# Patient Record
Sex: FEMALE | Race: OTHER | NOT HISPANIC OR LATINO | Employment: OTHER | URBAN - METROPOLITAN AREA
[De-identification: names, ages, dates, MRNs, and addresses within clinical notes are randomized per-mention and may not be internally consistent; named-entity substitution may affect disease eponyms.]

---

## 2022-02-21 LAB — CRC RECOMMENDATION EXT: NORMAL

## 2022-03-08 ENCOUNTER — OFFICE VISIT (OUTPATIENT)
Dept: OBSTETRICS AND GYNECOLOGY | Facility: CLINIC | Age: 72
End: 2022-03-08
Payer: MEDICARE

## 2022-03-08 VITALS
BODY MASS INDEX: 30.1 KG/M2 | HEIGHT: 68 IN | DIASTOLIC BLOOD PRESSURE: 82 MMHG | WEIGHT: 198.63 LBS | SYSTOLIC BLOOD PRESSURE: 138 MMHG

## 2022-03-08 DIAGNOSIS — Z01.419 WOMEN'S ANNUAL ROUTINE GYNECOLOGICAL EXAMINATION: Primary | ICD-10-CM

## 2022-03-08 PROCEDURE — G0101 PR CA SCREEN;PELVIC/BREAST EXAM: ICD-10-PCS | Mod: S$GLB,,, | Performed by: OBSTETRICS & GYNECOLOGY

## 2022-03-08 PROCEDURE — G0101 CA SCREEN;PELVIC/BREAST EXAM: HCPCS | Mod: S$GLB,,, | Performed by: OBSTETRICS & GYNECOLOGY

## 2022-03-08 RX ORDER — VALACYCLOVIR HYDROCHLORIDE 1 G/1
TABLET, FILM COATED ORAL
COMMUNITY
Start: 2022-02-24 | End: 2023-01-13 | Stop reason: SDUPTHER

## 2022-03-08 RX ORDER — SERTRALINE HYDROCHLORIDE 100 MG/1
200 TABLET, FILM COATED ORAL DAILY
COMMUNITY
Start: 2022-02-17 | End: 2022-09-30

## 2022-03-08 RX ORDER — ROSUVASTATIN CALCIUM 5 MG/1
5 TABLET, COATED ORAL
COMMUNITY
End: 2023-01-13 | Stop reason: SDUPTHER

## 2022-03-08 RX ORDER — LORATADINE 10 MG/1
10 TABLET ORAL
COMMUNITY
Start: 2022-02-09 | End: 2023-01-13 | Stop reason: SDUPTHER

## 2022-03-08 RX ORDER — TITANIUM DIOXIDE, OCTINOXATE, ZINC OXIDE 4.61; 1.6; .78 G/40ML; G/40ML; G/40ML
CREAM TOPICAL
COMMUNITY

## 2022-03-08 RX ORDER — OXYBUTYNIN CHLORIDE 5 MG/1
TABLET ORAL
COMMUNITY

## 2022-03-08 RX ORDER — ASCORBIC ACID 500 MG
500 TABLET ORAL
COMMUNITY

## 2022-03-08 RX ORDER — HYDROCHLOROTHIAZIDE 12.5 MG/1
CAPSULE ORAL
COMMUNITY
Start: 2022-02-09 | End: 2022-09-30

## 2022-03-08 RX ORDER — BACLOFEN 20 MG
TABLET ORAL
COMMUNITY

## 2022-03-08 RX ORDER — PNV NO.95/FERROUS FUM/FOLIC AC 28MG-0.8MG
TABLET ORAL
COMMUNITY
End: 2023-01-13 | Stop reason: SDUPTHER

## 2022-03-08 RX ORDER — CYANOCOBALAMIN (VITAMIN B-12) 2500 MCG
TABLET, SUBLINGUAL SUBLINGUAL
COMMUNITY

## 2022-03-08 RX ORDER — BIOTIN 5 MG
2 TABLET ORAL DAILY
COMMUNITY

## 2022-03-08 NOTE — PROGRESS NOTES
Annual Well Woman's Exam  History & Physical      SUBJECTIVE:     History of Present Illness:  Patient is a 71 y.o. female presents for her annual exam.  She has no complaints today.  Her [facility] is ordered her mammogram, but she has not yet completed this screening.  She had a colonoscopy performed last month.  Her prep was not adequate, so Dr. Ceron is planning to repeat her colonoscopy next year.      She is status post hysterectomy in 1987 for endometriosis.  She had 1 of her ovaries removed at that time.  Reports that she went through menopause approximately 5 years later and was started on hormone replacement therapy.  She is no longer on hormone replacement therapy.    She denies a history of abnormal Pap smear.  She has been with her  for 44 years.    She recently moved here from [place], [place].  Unfortunately her son-in-law who was in [place] passed away earlier this week.  She is very tearful when discussing this.    Chief Complaint   Patient presents with    Well Woman       Review of patient's allergies indicates:   Allergen Reactions    Adhesive Rash    Codeine Nausea Only    Latex, natural rubber Dermatitis, Itching and Rash       Current Outpatient Medications   Medication Sig Dispense Refill    ascorbic acid, vitamin C, (VITAMIN C) 500 MG tablet Place 500 mg under the tongue.      aspirin 81 mg Cap       biotin 5,000 mcg Subl Place under the tongue.      CALCIUM CARBONATE-VITAMIN D3 ORAL Take by mouth.      cranberry 400 mg Cap Take by mouth.      cyanocobalamin (VITAMIN B-12) 100 MCG tablet Take by mouth.      wwvomein-xjdf-tbc6-C-shruthi-bosw 750-625-30 mg Tab Take 2 tablets by mouth once daily.      hydroCHLOROthiazide (MICROZIDE) 12.5 mg capsule       loratadine (CLARITIN) 10 mg tablet Take 10 mg by mouth.      magnesium oxide 500 mg Tab Place under the tongue.      MULTIVITAMIN ORAL Take by mouth.      OMEPRAZOLE ORAL       oxybutynin (DITROPAN) 5 MG Tab       rosuvastatin  "(CRESTOR) 5 MG tablet Place 5 mg under the tongue.      sertraline (ZOLOFT) 100 MG tablet Take 200 mg by mouth once daily.      valACYclovir (VALTREX) 1000 MG tablet SMARTSI Tablet(s) By Mouth Every 12 Hours       No current facility-administered medications for this visit.     OB History        3    Para   3    Term   3            AB        Living   3       SAB        IAB        Ectopic        Multiple        Live Births                 No LMP recorded. Patient has had a hysterectomy.      Past Medical History:   Diagnosis Date    Hyperlipidemia     Hypertension      Past Surgical History:   Procedure Laterality Date    APPENDECTOMY      HYSTERECTOMY      TOTAL KNEE ARTHROPLASTY Right     TOTAL SHOULDER ARTHROPLASTY Right      Family History   Problem Relation Age of Onset    Diabetes Mother      Social History     Tobacco Use    Smoking status: Never Smoker    Smokeless tobacco: Never Used   Substance Use Topics    Alcohol use: Yes    Drug use: Never   She has a daughter that lives in [place] Her  passed away this week from cancer.  She has another daughter that lives in [place].  She has a son that lives in [place] with his partner.    OBJECTIVE:     Vital Signs (Most Recent)  BP: 138/82 (22 0932)  5' 8" (1.727 m)  90.1 kg (198 lb 9.6 oz)     Physical Exam:  Physical Exam  Vitals reviewed. Exam conducted with a chaperone present.   Constitutional:       Appearance: Normal appearance.   HENT:      Head: Normocephalic.   Neck:      Thyroid: No thyroid mass, thyromegaly or thyroid tenderness.   Pulmonary:      Effort: Pulmonary effort is normal.   Chest:   Breasts:      Right: Normal. No axillary adenopathy.      Left: Normal. No axillary adenopathy.       Abdominal:      General: Abdomen is flat.      Palpations: Abdomen is soft.   Genitourinary:     Comments: Atrophic vulva and vagina  Lymphadenopathy:      Upper Body:      Right upper body: No axillary adenopathy.      " Left upper body: No axillary adenopathy.   Skin:     General: Skin is warm and dry.   Neurological:      General: No focal deficit present.      Mental Status: She is alert and oriented to person, place, and time.   Psychiatric:         Mood and Affect: Mood normal.         Behavior: Behavior normal.           ASSESSMENT/PLAN:       ICD-10-CM ICD-9-CM   1. Women's annual routine gynecological examination  Z01.419 V72.31       PLAN:    --Patient is status post hysterectomy in and over age 65.  Paps no longer indicated.  --Mammogram has been ordered.  Encouraged patient to schedule her appointment and have screening performed.  --Colonoscopy is up-to-date.  --Patient does have a diagnosis of osteopenia.  This is managed by her PCM.  --Follow up in 1 year for annual exam or sooner as needed    Portia Hagan MD   Gynecology    [address]  [address]  [place] [address] [phone]

## 2022-08-27 ENCOUNTER — NURSE TRIAGE (OUTPATIENT)
Dept: ADMINISTRATIVE | Facility: CLINIC | Age: 72
End: 2022-08-27
Payer: MEDICARE

## 2022-08-27 NOTE — TELEPHONE ENCOUNTER
"[place]    PCP:  Dr. Trevor Mora    C/O urinary frequency, burning with urination, urinary pressure, and incontinence.  Per protocol, care advised is see HCP within 4 hrs.  OCA offered and accepted.  Instructed to call for worsening/questions/concerns.  VU.    Reason for Disposition   [1] Discomfort (pain, burning or stinging) when passing urine AND [2] female   Artificial heart valve or artificial joint    Additional Information   Negative: Shock suspected (e.g., cold/pale/clammy skin, too weak to stand, low BP, rapid pulse)   Negative: Sounds like a life-threatening emergency to the triager   Negative: Shock suspected (e.g., cold/pale/clammy skin, too weak to stand, low BP, rapid pulse)   Negative: Sounds like a life-threatening emergency to the triager   Negative: [1] Unable to urinate (or only a few drops) > 4 hours AND     [2] bladder feels very full (e.g., palpable bladder or strong urge to urinate)   Negative: Patient sounds very sick or weak to the triager   Negative: [1] SEVERE pain with urination  (e.g., excruciating) AND [2] not improved after 2 hours of pain medicine and Sitz bath   Negative: Fever > 100.5 F (38.1 C)   Negative: Side (flank) or lower back pain present   Negative: Diabetes mellitus or weak immune system (e.g., HIV positive, cancer chemotherapy, transplant patient)   Negative: Bedridden (e.g., nursing home patient, CVA, chronic illness, recovering from surgery)    Answer Assessment - Initial Assessment Questions  1. SYMPTOM: "What's the main symptom you're concerned about?" (e.g., frequency, incontinence)      Burning with urination, frequency, and incontinence  2. ONSET: "When did the  ________  start?"      2am  3. PAIN: "Is there any pain?" If so, ask: "How bad is it?" (Scale: 1-10; mild, moderate, severe)      Burning rated 5-6/10  4. CAUSE: "What do you think is causing the symptoms?"      UTI  5. OTHER SYMPTOMS: "Do you have any other symptoms?" (e.g., fever, flank pain, blood in " "urine, pain with urination)      Pain with urination  6. PREGNANCY: "Is there any chance you are pregnant?" "When was your last menstrual period?"      No    Protocols used: Urinary Symptoms-A-AH, Urination Pain - Female-A-AH    "

## 2022-09-30 ENCOUNTER — OFFICE VISIT (OUTPATIENT)
Dept: PODIATRY | Facility: CLINIC | Age: 72
End: 2022-09-30
Payer: MEDICARE

## 2022-09-30 VITALS
BODY MASS INDEX: 30.31 KG/M2 | DIASTOLIC BLOOD PRESSURE: 68 MMHG | HEART RATE: 102 BPM | HEIGHT: 68 IN | WEIGHT: 200 LBS | SYSTOLIC BLOOD PRESSURE: 130 MMHG

## 2022-09-30 DIAGNOSIS — L60.0 INGROWN NAIL: ICD-10-CM

## 2022-09-30 DIAGNOSIS — M79.671 PAIN IN RIGHT FOOT: ICD-10-CM

## 2022-09-30 DIAGNOSIS — M20.41 HAMMER TOE OF RIGHT FOOT: ICD-10-CM

## 2022-09-30 DIAGNOSIS — M20.11 HALLUX VALGUS OF RIGHT FOOT: Primary | ICD-10-CM

## 2022-09-30 PROCEDURE — 99203 OFFICE O/P NEW LOW 30 MIN: CPT | Mod: S$GLB,,, | Performed by: PODIATRIST

## 2022-09-30 PROCEDURE — 99203 PR OFFICE/OUTPT VISIT, NEW, LEVL III, 30-44 MIN: ICD-10-PCS | Mod: S$GLB,,, | Performed by: PODIATRIST

## 2022-09-30 RX ORDER — FLUTICASONE PROPIONATE 50 MCG
2 SPRAY, SUSPENSION (ML) NASAL 2 TIMES DAILY
COMMUNITY
Start: 2022-06-25

## 2022-09-30 RX ORDER — FOLIC ACID 1 MG/1
1 TABLET ORAL
COMMUNITY
Start: 2022-03-23 | End: 2023-03-23

## 2022-09-30 RX ORDER — NITROFURANTOIN 25; 75 MG/1; MG/1
100 CAPSULE ORAL 2 TIMES DAILY
COMMUNITY
Start: 2022-07-13 | End: 2023-07-13 | Stop reason: SDUPTHER

## 2022-09-30 RX ORDER — LANOLIN ALCOHOL/MO/W.PET/CERES
100 CREAM (GRAM) TOPICAL DAILY
COMMUNITY
Start: 2022-06-22

## 2022-09-30 RX ORDER — METHYLPREDNISOLONE 4 MG/1
TABLET ORAL
COMMUNITY
Start: 2022-05-02 | End: 2022-09-30

## 2022-09-30 RX ORDER — CIPROFLOXACIN 500 MG/1
500 TABLET ORAL 2 TIMES DAILY
COMMUNITY
Start: 2022-07-26 | End: 2022-09-30

## 2022-09-30 RX ORDER — LISINOPRIL 10 MG/1
10 TABLET ORAL EVERY MORNING
COMMUNITY
Start: 2022-07-13 | End: 2023-01-13 | Stop reason: SDUPTHER

## 2022-09-30 RX ORDER — HYDROCODONE BITARTRATE AND ACETAMINOPHEN 5; 325 MG/1; MG/1
1 TABLET ORAL
COMMUNITY
Start: 2022-08-01 | End: 2022-11-09

## 2022-09-30 RX ORDER — LISINOPRIL 10 MG/1
10 TABLET ORAL
COMMUNITY
Start: 2022-07-13 | End: 2023-07-13

## 2022-09-30 RX ORDER — SERTRALINE HYDROCHLORIDE 50 MG/1
150 TABLET, FILM COATED ORAL EVERY MORNING
COMMUNITY
Start: 2022-06-20 | End: 2022-11-09 | Stop reason: DRUGHIGH

## 2022-09-30 RX ORDER — LIDOCAINE 50 MG/G
1 PATCH TOPICAL DAILY
COMMUNITY
Start: 2022-07-30 | End: 2023-01-13 | Stop reason: SDUPTHER

## 2022-09-30 RX ORDER — VALACYCLOVIR HYDROCHLORIDE 1 G/1
1000 TABLET, FILM COATED ORAL
COMMUNITY
Start: 2022-07-06 | End: 2024-03-13

## 2022-09-30 RX ORDER — LORATADINE 10 MG/1
10 TABLET ORAL
COMMUNITY
Start: 2022-08-01

## 2022-09-30 RX ORDER — TERBINAFINE HYDROCHLORIDE 250 MG/1
250 TABLET ORAL
COMMUNITY
Start: 2022-09-22 | End: 2022-12-15

## 2022-09-30 RX ORDER — BUSPIRONE HYDROCHLORIDE 10 MG/1
TABLET ORAL
COMMUNITY
Start: 2022-08-09 | End: 2023-01-13 | Stop reason: SDUPTHER

## 2022-09-30 RX ORDER — ROSUVASTATIN CALCIUM 5 MG/1
5 TABLET, COATED ORAL
COMMUNITY
Start: 2022-03-23

## 2022-09-30 RX ORDER — HYDROCODONE BITARTRATE AND ACETAMINOPHEN 10; 325 MG/1; MG/1
1 TABLET ORAL EVERY 6 HOURS PRN
COMMUNITY
Start: 2022-08-23 | End: 2022-11-09

## 2022-09-30 RX ORDER — FUROSEMIDE 20 MG/1
20 TABLET ORAL
COMMUNITY
Start: 2022-06-08 | End: 2023-03-14

## 2022-09-30 RX ORDER — PNV NO.95/FERROUS FUM/FOLIC AC 28MG-0.8MG
100 TABLET ORAL
COMMUNITY
Start: 2022-03-23 | End: 2023-03-23

## 2022-09-30 RX ORDER — ONDANSETRON 4 MG/1
TABLET, ORALLY DISINTEGRATING ORAL
COMMUNITY
Start: 2022-08-23 | End: 2024-03-13

## 2022-09-30 RX ORDER — DOCUSATE SODIUM 100 MG/1
100-200 CAPSULE, LIQUID FILLED ORAL NIGHTLY
COMMUNITY
Start: 2022-08-23

## 2022-09-30 RX ORDER — SCOLOPAMINE TRANSDERMAL SYSTEM 1 MG/1
1 PATCH, EXTENDED RELEASE TRANSDERMAL
COMMUNITY
Start: 2022-07-28 | End: 2023-04-13

## 2022-09-30 RX ORDER — IPRATROPIUM BROMIDE 21 UG/1
2 SPRAY, METERED NASAL 3 TIMES DAILY
COMMUNITY
Start: 2022-08-17

## 2022-09-30 RX ORDER — DEXTROMETHORPHAN HYDROBROMIDE, GUAIFENESIN 5; 100 MG/5ML; MG/5ML
LIQUID ORAL
COMMUNITY

## 2022-09-30 RX ORDER — SERTRALINE HYDROCHLORIDE 50 MG/1
150 TABLET, FILM COATED ORAL
COMMUNITY
Start: 2022-06-20 | End: 2022-11-09 | Stop reason: DRUGHIGH

## 2022-09-30 RX ORDER — BUSPIRONE HYDROCHLORIDE 10 MG/1
10 TABLET ORAL 2 TIMES DAILY PRN
COMMUNITY
Start: 2022-07-05

## 2022-09-30 RX ORDER — HYDROCODONE BITARTRATE AND ACETAMINOPHEN 10; 325 MG/1; MG/1
1 TABLET ORAL EVERY 6 HOURS PRN
COMMUNITY
Start: 2022-08-23 | End: 2022-09-30

## 2022-09-30 RX ORDER — FOLIC ACID 1 MG/1
1000 TABLET ORAL DAILY
COMMUNITY
Start: 2022-06-22 | End: 2023-01-13 | Stop reason: SDUPTHER

## 2022-09-30 RX ORDER — NIRMATRELVIR AND RITONAVIR 150-100 MG
KIT ORAL
COMMUNITY
Start: 2022-06-22 | End: 2023-01-13 | Stop reason: SDUPTHER

## 2022-09-30 RX ORDER — HYDROCODONE BITARTRATE AND ACETAMINOPHEN 7.5; 325 MG/1; MG/1
1 TABLET ORAL EVERY 6 HOURS PRN
COMMUNITY
Start: 2022-09-14 | End: 2022-09-30

## 2022-09-30 RX ORDER — TERBINAFINE HYDROCHLORIDE 250 MG/1
250 TABLET ORAL EVERY MORNING
COMMUNITY
Start: 2022-09-22 | End: 2023-07-13 | Stop reason: SDUPTHER

## 2022-09-30 RX ORDER — CYCLOBENZAPRINE HCL 10 MG
1 TABLET ORAL 2 TIMES DAILY PRN
COMMUNITY
Start: 2022-08-23

## 2022-09-30 RX ORDER — OMEPRAZOLE 40 MG/1
40 CAPSULE, DELAYED RELEASE ORAL 2 TIMES DAILY
COMMUNITY
Start: 2022-07-06

## 2022-09-30 RX ORDER — LIDOCAINE 50 MG/G
1 PATCH TOPICAL
COMMUNITY
Start: 2022-07-30 | End: 2023-01-13 | Stop reason: SDUPTHER

## 2022-09-30 RX ORDER — FUROSEMIDE 20 MG/1
20 TABLET ORAL EVERY MORNING
COMMUNITY
Start: 2022-08-17 | End: 2023-03-14

## 2022-09-30 RX ORDER — CYCLOBENZAPRINE HCL 10 MG
10 TABLET ORAL 2 TIMES DAILY PRN
COMMUNITY
Start: 2022-08-23 | End: 2023-01-13 | Stop reason: SDUPTHER

## 2022-09-30 NOTE — PROGRESS NOTES
Subjective:      Patient ID: Serena Irving is a 72 y.o. female.    Chief Complaint: Toe Pain    Serena is a 72 y.o. female who presents to the podiatry clinic  with complaint of  right foot pain. Onset of the symptoms was several weeks ago. Precipitating event:  possible ingrown toenail . Current symptoms include: ability to bear weight, but with some pain, redness, swelling, and worsening symptoms after a period of activity. Aggravating factors:  direct pressure and tight shoes . Symptoms have gradually improved. Patient has had prior foot problems. Evaluation to date: none. Treatment to date:  patient had a friend trim right first digit nail plate . Patients rates pain 2/10 on pain scale.  Patient also complains of occasional irritation right foot bunion and hammertoe with certain shoe gear types.     Review of Systems   Constitutional: Negative for chills and fever.   Cardiovascular:  Negative for chest pain and leg swelling.   Respiratory:  Negative for cough and shortness of breath.    Gastrointestinal:  Negative for diarrhea, nausea and vomiting.         Objective:      Physical Exam  Vitals reviewed.   Constitutional:       Appearance: Normal appearance. She is not ill-appearing.   HENT:      Head: Normocephalic and atraumatic.      Nose: Nose normal.   Pulmonary:      Effort: Pulmonary effort is normal. No respiratory distress.   Skin:     Capillary Refill: Capillary refill takes 2 to 3 seconds.   Neurological:      Mental Status: She is alert and oriented to person, place, and time.   Psychiatric:         Mood and Affect: Mood normal.         Behavior: Behavior normal.     Neurologic:  Protective and light touch sensation intact bilateral lower extremity   Musculoskeletal:  Medial deviation of right 1st metatarsal with prominent dorsal medial eminence, contracted right 2nd digit with overriding nature to right 1st digit laterally, ankle joint range of motion is mildly reduced without pain, no masses noted  bilateral foot   Dermatologic:  Incurvated right 1st digit lateral nail border with evidence of previous slant back performed, peeling skin mild resolving erythema and swelling noted to right 1st digit lateral nail border, no open lesions noted bilateral foot, no rashes noted bilateral foot   Vascular:  DP/ PT pulses palpable 1/4 bilateral foot, skin temperature gradient within normal limits from proximal to distal bilateral foot        Assessment:       Encounter Diagnoses   Name Primary?    Hallux valgus of right foot Yes    Hammer toe of right foot     Ingrown nail     Pain in right foot          Plan:       Serena was seen today for toe pain.    Diagnoses and all orders for this visit:    Hallux valgus of right foot    Hammer toe of right foot    Ingrown nail    Pain in right foot    I counseled the patient on her conditions, their implications and medical management.        1. Patient was examined and evaluated  2. Discussed with patient etiology of ingrown toenail.  Discussed permanent versus nonpermanent procedure with the patient.  Discussed slant back of right 1st digit lateral nail border.  Patient had triple antibiotic ointment and a dry sterile bandage applied to the right 1st digit lateral nail border out the further slant back was performed  3. Discussed hammertoes and bunions with the patient.  Conservative treatment options were discussed with patient including increased toe box height and width and selection of shoe gear with soft stretchable uppers.  Patient was made aware that 2nd digit overriding 1st digit is making her ingrown toenail symptoms worse   4. Patient will consider possible injection therapy for any pain related to bunions.  Patient will discontinue any use of flip-flops.    5. Patient will follow-up in 1 month for further evaluation of right 1st digit lateral nail border or p.r.n. for complaints

## 2022-10-13 ENCOUNTER — APPOINTMENT (OUTPATIENT)
Dept: OBSTETRICS AND GYNECOLOGY | Facility: CLINIC | Age: 72
End: 2022-10-13
Payer: MEDICARE

## 2022-10-13 DIAGNOSIS — R30.0 DYSURIA: Primary | ICD-10-CM

## 2022-10-13 PROCEDURE — 87086 URINE CULTURE/COLONY COUNT: CPT | Performed by: OBSTETRICS & GYNECOLOGY

## 2022-10-13 PROCEDURE — 87186 SC STD MICRODIL/AGAR DIL: CPT | Performed by: OBSTETRICS & GYNECOLOGY

## 2022-10-13 PROCEDURE — 87088 URINE BACTERIA CULTURE: CPT | Performed by: OBSTETRICS & GYNECOLOGY

## 2022-10-13 PROCEDURE — 87077 CULTURE AEROBIC IDENTIFY: CPT | Performed by: OBSTETRICS & GYNECOLOGY

## 2022-10-17 DIAGNOSIS — N30.00 ACUTE CYSTITIS WITHOUT HEMATURIA: Primary | ICD-10-CM

## 2022-10-17 LAB — BACTERIA UR CULT: ABNORMAL

## 2022-10-17 RX ORDER — SULFAMETHOXAZOLE AND TRIMETHOPRIM 800; 160 MG/1; MG/1
1 TABLET ORAL 2 TIMES DAILY
Qty: 10 TABLET | Refills: 0 | Status: SHIPPED | OUTPATIENT
Start: 2022-10-17 | End: 2022-10-22

## 2022-10-18 ENCOUNTER — OFFICE VISIT (OUTPATIENT)
Dept: OBSTETRICS AND GYNECOLOGY | Facility: CLINIC | Age: 72
End: 2022-10-18
Payer: MEDICARE

## 2022-10-18 VITALS
BODY MASS INDEX: 31.22 KG/M2 | DIASTOLIC BLOOD PRESSURE: 70 MMHG | WEIGHT: 206 LBS | HEIGHT: 68 IN | SYSTOLIC BLOOD PRESSURE: 118 MMHG

## 2022-10-18 DIAGNOSIS — N30.00 ACUTE CYSTITIS WITHOUT HEMATURIA: Primary | ICD-10-CM

## 2022-10-18 PROCEDURE — 99204 PR OFFICE/OUTPT VISIT, NEW, LEVL IV, 45-59 MIN: ICD-10-PCS | Mod: S$GLB,,, | Performed by: OBSTETRICS & GYNECOLOGY

## 2022-10-18 PROCEDURE — 99204 OFFICE O/P NEW MOD 45 MIN: CPT | Mod: S$GLB,,, | Performed by: OBSTETRICS & GYNECOLOGY

## 2022-10-24 NOTE — PROGRESS NOTES
"Gynecology Visit     Subjective:       Patient ID: Serena Irving is a 72 y.o. female.    Chief Complaint:  Urinary Tract Infection (Pt would like to discuss recurrent UTI's )      History of Present Illness  73yo  female presents complaining of recurrent UTI. She is currently taking Bactrim for UTI. She also had UTIs in July and January this year. She is not sexually active. Her  has an extensive heart health history.     GYN & OB History  No LMP recorded. Patient has had a hysterectomy.       OB History    Para Term  AB Living   3 3 3     3   SAB IAB Ectopic Multiple Live Births                  # Outcome Date GA Lbr Darío/2nd Weight Sex Delivery Anes PTL Lv   3 Term            2 Term            1 Term                    /70 (BP Location: Right arm, Patient Position: Sitting)   Ht 5' 8" (1.727 m)   Wt 93.4 kg (206 lb)   BMI 31.32 kg/m²     Objective:    Physical Exam:   Constitutional: She is oriented to person, place, and time. She appears well-developed and well-nourished.    HENT:   Head: Normocephalic and atraumatic.       Pulmonary/Chest: Effort normal.                      Neurological: She is alert and oriented to person, place, and time.     Psychiatric: She has a normal mood and affect.          Urine culture on 10/13/2022: pos for E. Coli    Assessment:        1. Acute cystitis without hematuria             Plan:      Complete Bactrim prescription.  Recommend starting D-mannose supplement.   If UTIs continue, consider vaginal estrogen or ppx abx.   Follow up prn.       Portia Hagan MD   Gynecology    [address]  [address]  [place] [address] [phone]       "

## 2022-11-09 ENCOUNTER — OFFICE VISIT (OUTPATIENT)
Dept: PODIATRY | Facility: CLINIC | Age: 72
End: 2022-11-09
Payer: MEDICARE

## 2022-11-09 VITALS
BODY MASS INDEX: 31.22 KG/M2 | WEIGHT: 206 LBS | HEART RATE: 90 BPM | HEIGHT: 68 IN | DIASTOLIC BLOOD PRESSURE: 63 MMHG | SYSTOLIC BLOOD PRESSURE: 119 MMHG

## 2022-11-09 DIAGNOSIS — M20.11 VALGUS DEFORMITY OF BOTH GREAT TOES: ICD-10-CM

## 2022-11-09 DIAGNOSIS — M20.41 HAMMER TOE OF RIGHT FOOT: Primary | ICD-10-CM

## 2022-11-09 DIAGNOSIS — M20.12 VALGUS DEFORMITY OF BOTH GREAT TOES: ICD-10-CM

## 2022-11-09 PROCEDURE — 99212 PR OFFICE/OUTPT VISIT, EST, LEVL II, 10-19 MIN: ICD-10-PCS | Mod: S$GLB,,, | Performed by: PODIATRIST

## 2022-11-09 PROCEDURE — 99212 OFFICE O/P EST SF 10 MIN: CPT | Mod: S$GLB,,, | Performed by: PODIATRIST

## 2022-11-09 RX ORDER — HYDROCODONE BITARTRATE AND ACETAMINOPHEN 7.5; 325 MG/1; MG/1
1 TABLET ORAL EVERY 6 HOURS PRN
COMMUNITY
Start: 2022-11-03 | End: 2023-01-13 | Stop reason: SDUPTHER

## 2022-11-09 RX ORDER — SERTRALINE HYDROCHLORIDE 100 MG/1
TABLET, FILM COATED ORAL
COMMUNITY
Start: 2022-11-08

## 2022-11-11 NOTE — PROGRESS NOTES
Subjective:      Patient ID: Serena Irving is a 72 y.o. female.    Chief Complaint: Toe Pain    Serena is a 72 y.o. female who presents to the podiatry clinic  with complaint of  bilateral foot pain. Onset of the symptoms was several months ago. Precipitating event:  irritation of bunions by ill fitted shoes . Current symptoms include: ability to bear weight, but with some pain, stiffness, and worsening symptoms after a period of activity. Aggravating factors:  uncomfortable shoes . Symptoms have stabilized. Patient has had prior foot problems. Treatment to date: avoidance of offending activity, rest, and attempted change of shoes . Patients rates pain 0/10 on pain scale.    Review of Systems   Constitutional: Negative for chills and fever.   Cardiovascular:  Negative for chest pain and leg swelling.   Respiratory:  Negative for cough and shortness of breath.    Gastrointestinal:  Negative for diarrhea, nausea and vomiting.         Objective:      Physical Exam  Vitals reviewed.   Constitutional:       General: She is not in acute distress.     Appearance: Normal appearance. She is not ill-appearing.   HENT:      Nose: Nose normal.   Cardiovascular:      Rate and Rhythm: Normal rate.   Pulmonary:      Effort: Pulmonary effort is normal. No respiratory distress.   Skin:     Capillary Refill: Capillary refill takes 2 to 3 seconds.   Neurological:      Mental Status: She is alert and oriented to person, place, and time.   Psychiatric:         Mood and Affect: Mood normal.         Behavior: Behavior normal.         Thought Content: Thought content normal.         Judgment: Judgment normal.     Neurologic:  Protective and light touch sensation intact bilateral lower extremity, + occasional paresthesias reported   Musculoskeletal:  Medial deviation of bilateral 1st metatarsal with prominent dorsal medial eminence, contracted right 2nd digit with overriding nature to right 1st digit laterally, ankle joint range of motion is  mildly reduced without pain, no masses noted bilateral foot   Dermatologic:  Incurvated right 1st digit lateral nail border with evidence of previous slant back performed, no open lesions noted bilateral foot, no rashes noted bilateral foot   Vascular:  DP/ PT pulses palpable 1/4 bilateral foot, skin temperature gradient within normal limits from proximal to distal bilateral foot        Assessment:       Encounter Diagnoses   Name Primary?    Hammer toe of right foot Yes    Valgus deformity of both great toes          Plan:       Serena was seen today for toe pain.    Diagnoses and all orders for this visit:    Hammer toe of right foot    Valgus deformity of both great toes    I counseled the patient on her conditions, their implications and medical management.        1. Patient was examined and evaluated  2. Patient made aware that previous irritated ingrown toenail site seems to have resolved.  Patient will monitor area for possible progression of symptoms.  Discussed again possible nail procedure if symptoms worsen over time.    3. Discussed again with patient etiology of bunion deformity.  Patient was encouraged to continue use of shoe gear with increased toe box width and height and with soft stretchable uppers.  Patient was advised to adjunct with OTC analgesics for pain relief  4. Patient will ultimately follow up in 2-3 months or p.r.n. for complaints

## 2023-01-13 ENCOUNTER — OFFICE VISIT (OUTPATIENT)
Dept: PODIATRY | Facility: CLINIC | Age: 73
End: 2023-01-13
Payer: MEDICARE

## 2023-01-13 VITALS
HEART RATE: 90 BPM | WEIGHT: 206 LBS | SYSTOLIC BLOOD PRESSURE: 140 MMHG | HEIGHT: 68 IN | DIASTOLIC BLOOD PRESSURE: 62 MMHG | BODY MASS INDEX: 31.22 KG/M2

## 2023-01-13 DIAGNOSIS — M20.12 VALGUS DEFORMITY OF BOTH GREAT TOES: ICD-10-CM

## 2023-01-13 DIAGNOSIS — I73.9 ASYMPTOMATIC PVD (PERIPHERAL VASCULAR DISEASE): Primary | ICD-10-CM

## 2023-01-13 DIAGNOSIS — M20.42 HAMMER TOES OF BOTH FEET: ICD-10-CM

## 2023-01-13 DIAGNOSIS — B35.1 ONYCHOMYCOSIS DUE TO DERMATOPHYTE: ICD-10-CM

## 2023-01-13 DIAGNOSIS — M20.11 VALGUS DEFORMITY OF BOTH GREAT TOES: ICD-10-CM

## 2023-01-13 DIAGNOSIS — M20.41 HAMMER TOES OF BOTH FEET: ICD-10-CM

## 2023-01-13 PROCEDURE — 11719 ROUTINE FOOT CARE: ICD-10-PCS | Mod: Q9,S$GLB,, | Performed by: PODIATRIST

## 2023-01-13 PROCEDURE — 11719 TRIM NAIL(S) ANY NUMBER: CPT | Mod: Q9,S$GLB,, | Performed by: PODIATRIST

## 2023-01-13 PROCEDURE — 99213 PR OFFICE/OUTPT VISIT, EST, LEVL III, 20-29 MIN: ICD-10-PCS | Mod: 25,S$GLB,, | Performed by: PODIATRIST

## 2023-01-13 PROCEDURE — 99213 OFFICE O/P EST LOW 20 MIN: CPT | Mod: 25,S$GLB,, | Performed by: PODIATRIST

## 2023-01-13 RX ORDER — GABAPENTIN 300 MG/1
300 CAPSULE ORAL 2 TIMES DAILY
COMMUNITY
Start: 2023-01-09 | End: 2023-07-13 | Stop reason: SDUPTHER

## 2023-01-13 NOTE — PROGRESS NOTES
Subjective:      Patient ID: Serena rIving is a 72 y.o. female.    Chief Complaint: Foot Pain    Serena is a 72 y.o. female who presents to the clinic for evaluation and treatment of high risk feet. Serena has a past medical history of Hyperlipidemia and Hypertension. The patient's chief complaint is long, thick toenails. This patient has documented high risk feet requiring routine maintenance secondary to peripheral vascular disease.    PCP: Trevor Mora MD    Date Last Seen by PCP: 12/28/2022    Current shoe gear:  Affected Foot: Casual shoes     Unaffected Foot: Casual shoes    Last encounter in this department: 11/9/2022    Hemoglobin A1C   Date Value Ref Range Status   09/22/2022 5.6 (L) 5.7 - 6.4 % Final       Review of Systems   Constitutional: Negative for chills and fever.   Cardiovascular:  Positive for leg swelling. Negative for chest pain.   Respiratory:  Negative for cough and shortness of breath.    Gastrointestinal:  Negative for diarrhea, nausea and vomiting.         Objective:      Physical Exam  Vitals reviewed.   Constitutional:       General: She is not in acute distress.     Appearance: Normal appearance. She is not ill-appearing.   HENT:      Nose: Nose normal.   Cardiovascular:      Rate and Rhythm: Normal rate.   Pulmonary:      Effort: Pulmonary effort is normal. No respiratory distress.   Skin:     Capillary Refill: Capillary refill takes 2 to 3 seconds.   Neurological:      Mental Status: She is alert and oriented to person, place, and time.   Psychiatric:         Mood and Affect: Mood normal.         Behavior: Behavior normal.         Thought Content: Thought content normal.         Judgment: Judgment normal.     Neurologic:  Protective and light touch sensation intact bilateral lower extremity, + occasional paresthesias reported   Musculoskeletal:  Medial deviation of bilateral 1st metatarsal with prominent dorsal medial eminence, contracted right 2nd digit with overriding nature to  right 1st digit laterally, ankle joint range of motion is mildly reduced without pain, no masses noted bilateral foot   Dermatologic:  Incurvated right 1st digit lateral nail border with evidence of previous slant back performed, no open lesions noted bilateral foot, no rashes noted bilateral foot, mild thickening and mild discoloration noted to the bilateral 1st digit nail plate   Vascular:  DP/ PT pulses palpable 1/4 bilateral foot, skin temperature gradient warm to cool from proximal to distal bilateral lower extremity, +1 nonpitting edema noted to the bilateral ankle, positive telangiectasias and varicosities noted, pedal hair growth is absent to the distal aspect of the digits, mild soft tissue atrophic skin changes noted to the lower 1/3 of the leg and dorsal foot      Assessment:       Encounter Diagnoses   Name Primary?    Asymptomatic PVD (peripheral vascular disease) Yes    Onychomycosis due to dermatophyte     Hammer toes of both feet     Valgus deformity of both great toes          Plan:       Serena was seen today for foot pain.    Diagnoses and all orders for this visit:    Asymptomatic PVD (peripheral vascular disease)  -     Routine Foot Care    Onychomycosis due to dermatophyte  -     Routine Foot Care    Hammer toes of both feet    Valgus deformity of both great toes      I counseled the patient on her conditions, their implications and medical management.        1. Patient was examined and evaluated  2. Discussed with patient etiology onychomycosis.  Discussed fungal nail changes with the patient and topical conservative treatment such as tea tree oil and Vicks vapor rub.    Routine Foot Care    Date/Time: 1/13/2023 3:15 PM  Performed by: Jigar Vigil DPM  Authorized by: Jigar Vigil DPM     Consent Done?:  Yes (Verbal)  Hyperkeratotic Skin Lesions?: No      Nail Care Type:  Trim  Location(s): All  (Left 1st Toe, Left 3rd Toe, Left 2nd Toe, Left 4th Toe, Left 5th Toe, Right 1st Toe,  Right 2nd Toe, Right 3rd Toe, Right 4th Toe and Right 5th Toe)  Patient tolerance:  Patient tolerated the procedure well with no immediate complications    3. Discussed with patient etiology of ingrown toenail.  Patient made aware that her current right 1st digit lateral nail border does not need a ingrown toenail procedure but does have a portion of callus within the lateral nail fold which needs to be hydrated on a regular basis.    4. Patient was advised of early peripheral vascular changes.  Patient was advised to continue with elevation of lower extremity when at rest.  Patient will continue with daily use of compression stockings.  Patient will monitor fluid intake and salt intake in her diet.    5. Patient was advised to continue with comfortable shoe gear both inside and outside the home.  Patient was advised adjust shoe gear for better comfort and fit for accommodation of hammertoes and bunions.  Patient was select shoe gear with soft stretchable uppers increase toe box height and increased toe box width  6. Patient will follow-up in 3 months or p.r.n. for foot complaints

## 2023-02-21 LAB — CRC RECOMMENDATION EXT: NORMAL

## 2023-03-14 ENCOUNTER — OFFICE VISIT (OUTPATIENT)
Dept: OBSTETRICS AND GYNECOLOGY | Facility: CLINIC | Age: 73
End: 2023-03-14
Payer: MEDICARE

## 2023-03-14 VITALS
DIASTOLIC BLOOD PRESSURE: 66 MMHG | SYSTOLIC BLOOD PRESSURE: 138 MMHG | BODY MASS INDEX: 30.01 KG/M2 | HEIGHT: 68 IN | WEIGHT: 198 LBS

## 2023-03-14 DIAGNOSIS — Z01.419 WOMEN'S ANNUAL ROUTINE GYNECOLOGICAL EXAMINATION: Primary | ICD-10-CM

## 2023-03-14 DIAGNOSIS — Z12.39 BREAST CANCER SCREENING OTHER THAN MAMMOGRAM: ICD-10-CM

## 2023-03-14 PROCEDURE — G0101 CA SCREEN;PELVIC/BREAST EXAM: HCPCS | Mod: S$GLB,,, | Performed by: OBSTETRICS & GYNECOLOGY

## 2023-03-14 PROCEDURE — G0101 PR CA SCREEN;PELVIC/BREAST EXAM: ICD-10-PCS | Mod: S$GLB,,, | Performed by: OBSTETRICS & GYNECOLOGY

## 2023-03-14 NOTE — PROGRESS NOTES
"Annual Well Woman's Exam  History & Physical      SUBJECTIVE:     History of Present Illness:  Patient is a 72 y.o. female presents for her annual exam. She has no complaints today. She is s/p hysterectomy.     Chief Complaint   Patient presents with    Gynecologic Exam     Annual exam "no issues"       Review of patient's allergies indicates:   Allergen Reactions    Sulfa (sulfonamide antibiotics)      Other reaction(s): Hives    Adhesive Rash    Codeine Nausea Only    Latex, natural rubber Dermatitis, Itching and Rash       Current Outpatient Medications   Medication Sig Dispense Refill    acetaminophen (TYLENOL) 650 MG TbSR Take by mouth.      ascorbic acid, vitamin C, (VITAMIN C) 500 MG tablet Place 500 mg under the tongue.      aspirin 81 mg Cap       biotin 5,000 mcg Subl Place under the tongue.      busPIRone (BUSPAR) 10 MG tablet Take 10 mg by mouth 2 (two) times daily as needed.      CALCIUM CARBONATE-VITAMIN D3 ORAL Take by mouth.      cranberry 400 mg Cap Take by mouth.      cyanocobalamin (VITAMIN B-12) 100 MCG tablet Take 100 mcg by mouth.      cyclobenzaprine (FLEXERIL) 10 MG tablet Take 1 tablet by mouth 2 (two) times daily as needed.      docusate sodium (COLACE) 100 MG capsule Take 100-200 mg by mouth every evening.      fluticasone propionate (FLONASE) 50 mcg/actuation nasal spray 2 sprays by Each Nostril route 2 (two) times daily.      folic acid (FOLVITE) 1 MG tablet Take 1 mg by mouth.      gabapentin (NEURONTIN) 300 MG capsule Take 300 mg by mouth 2 (two) times daily.      wusetnqr-hslv-hsp0-C-shruthi-bosw 750-625-30 mg Tab Take 2 tablets by mouth once daily.      ipratropium (ATROVENT) 21 mcg (0.03 %) nasal spray 2 sprays 3 (three) times daily.      lisinopriL 10 MG tablet Take 10 mg by mouth.      loratadine (CLARITIN) 10 mg tablet Take 10 mg by mouth.      magnesium oxide 500 mg Tab Place under the tongue.      MULTIVITAMIN ORAL Take by mouth.      nitrofurantoin, macrocrystal-monohydrate, " "(MACROBID) 100 MG capsule Take 100 mg by mouth 2 (two) times daily.      omeprazole (PRILOSEC) 40 MG capsule Take 40 mg by mouth 2 (two) times daily.      ondansetron (ZOFRAN-ODT) 4 MG TbDL DISSOLVE 1-2 TABLETS ON TOP OF THE TONGUE TWICE DAILY AS NEEDED FOR NAUSEA      oxybutynin (DITROPAN) 5 MG Tab       rosuvastatin (CRESTOR) 5 MG tablet Take 5 mg by mouth.      scopolamine (TRANSDERM-SCOP) 1.3-1.5 mg (1 mg over 3 days) 1 patch Every 3 (three) days.      sertraline (ZOLOFT) 100 MG tablet Take by mouth.      terbinafine HCL (LAMISIL) 250 mg tablet Take 250 mg by mouth every morning.      thiamine 100 MG tablet Take 100 mg by mouth once daily.      valACYclovir (VALTREX) 1000 MG tablet Take 1,000 mg by mouth.       No current facility-administered medications for this visit.     OB History          3    Para   3    Term   3            AB        Living   3         SAB        IAB        Ectopic        Multiple        Live Births                 No LMP recorded. Patient has had a hysterectomy.      Past Medical History:   Diagnosis Date    Hyperlipidemia     Hypertension      Past Surgical History:   Procedure Laterality Date    APPENDECTOMY      disk fusion      HYSTERECTOMY      TOTAL KNEE ARTHROPLASTY Right     TOTAL SHOULDER ARTHROPLASTY Right      Family History   Problem Relation Age of Onset    Cancer Mother     Diabetes Mother     Cancer Father     Cancer Sister     Cancer Maternal Cousin      Social History     Tobacco Use    Smoking status: Never    Smokeless tobacco: Never   Substance Use Topics    Alcohol use: Yes    Drug use: Never        OBJECTIVE:     Vital Signs (Most Recent)  BP: 138/66 (23 1004)  5' 8" (1.727 m)  89.8 kg (198 lb)     Physical Exam:  Physical Exam  Vitals reviewed.   Constitutional:       Appearance: Normal appearance.   HENT:      Head: Normocephalic.   Neck:      Thyroid: No thyroid mass, thyromegaly or thyroid tenderness.   Pulmonary:      Effort: Pulmonary " effort is normal.   Chest:   Breasts:     Right: Normal.      Left: Normal.   Abdominal:      General: Abdomen is flat.      Palpations: Abdomen is soft.   Genitourinary:     General: Normal vulva.      Vagina: Normal.   Lymphadenopathy:      Upper Body:      Right upper body: No axillary adenopathy.      Left upper body: No axillary adenopathy.   Skin:     General: Skin is warm and dry.   Neurological:      General: No focal deficit present.      Mental Status: She is alert and oriented to person, place, and time.   Psychiatric:         Mood and Affect: Mood normal.         Behavior: Behavior normal.         ASSESSMENT/PLAN:       ICD-10-CM ICD-9-CM   1. Women's annual routine gynecological examination  Z01.419 V72.31   2. Breast cancer screening other than mammogram  Z12.39 V76.10       PLAN:    --Paps no longer indicated. Pt is s/p hysterectomy and has been in a monogamous relationship for 46 years.   --Mammogram ordered.  --Colonoscopy performed last month. Both parents passed from colon cancer.   --Dexa scan up to date.   --Signigicant family h/o colon cancer, breast and uterine cancer. Discussed possible Carrero syndrome. Horacio carroll given to patient.   --Follow up in 1 year for annual exam or sooner as needed    Portia Hagan MD   Gynecology    [address]   [address]  [place] [address] [phone]

## 2023-04-13 ENCOUNTER — OFFICE VISIT (OUTPATIENT)
Dept: PODIATRY | Facility: CLINIC | Age: 73
End: 2023-04-13
Payer: MEDICARE

## 2023-04-13 VITALS
HEART RATE: 88 BPM | WEIGHT: 194.19 LBS | DIASTOLIC BLOOD PRESSURE: 63 MMHG | SYSTOLIC BLOOD PRESSURE: 118 MMHG | BODY MASS INDEX: 29.43 KG/M2 | HEIGHT: 68 IN

## 2023-04-13 DIAGNOSIS — M20.12 VALGUS DEFORMITY OF BOTH GREAT TOES: Primary | ICD-10-CM

## 2023-04-13 DIAGNOSIS — B35.1 ONYCHOMYCOSIS DUE TO DERMATOPHYTE: ICD-10-CM

## 2023-04-13 DIAGNOSIS — M20.42 HAMMER TOES OF BOTH FEET: ICD-10-CM

## 2023-04-13 DIAGNOSIS — I73.9 ASYMPTOMATIC PVD (PERIPHERAL VASCULAR DISEASE): ICD-10-CM

## 2023-04-13 DIAGNOSIS — M20.41 HAMMER TOES OF BOTH FEET: ICD-10-CM

## 2023-04-13 DIAGNOSIS — M20.11 VALGUS DEFORMITY OF BOTH GREAT TOES: Primary | ICD-10-CM

## 2023-04-13 PROCEDURE — 99212 OFFICE O/P EST SF 10 MIN: CPT | Mod: S$GLB,,, | Performed by: PODIATRIST

## 2023-04-13 PROCEDURE — 99212 PR OFFICE/OUTPT VISIT, EST, LEVL II, 10-19 MIN: ICD-10-PCS | Mod: S$GLB,,, | Performed by: PODIATRIST

## 2023-04-13 RX ORDER — NAPROXEN 500 MG/1
500 TABLET ORAL 2 TIMES DAILY PRN
COMMUNITY
Start: 2023-03-28 | End: 2024-03-13

## 2023-04-13 RX ORDER — ONDANSETRON 4 MG/1
4 TABLET, FILM COATED ORAL DAILY PRN
COMMUNITY
Start: 2023-02-13 | End: 2023-07-13 | Stop reason: SDUPTHER

## 2023-04-13 RX ORDER — POLYETHYLENE GLYCOL-3350 AND ELECTROLYTES 236; 6.74; 5.86; 2.97; 22.74 G/274.31G; G/274.31G; G/274.31G; G/274.31G; G/274.31G
POWDER, FOR SOLUTION ORAL
COMMUNITY
Start: 2023-02-14

## 2023-04-13 RX ORDER — FUROSEMIDE 20 MG/1
20 TABLET ORAL
COMMUNITY
Start: 2023-04-06

## 2023-04-13 NOTE — PROGRESS NOTES
Subjective:     Patient ID: Serena Irving is a 73 y.o. female.    Chief Complaint: Nail Care    Serena is a 73 y.o. female who presents to the podiatry clinic  with complaint of  right foot pain. Onset of the symptoms was several years ago. Precipitating event:  progression of bunion deformity and growth of callus . Current symptoms include: stiffness and worsening symptoms after a period of activity. Aggravating factors:  ill fitted shoes . Symptoms have stabilized. Patient has had prior foot problems. Treatment to date: rest and change in shoes . Patients rates pain 0/10 on pain scale.    Review of Systems   Constitutional: Negative for chills and fever.   Cardiovascular:  Positive for leg swelling. Negative for chest pain.   Respiratory:  Negative for cough and shortness of breath.    Gastrointestinal:  Negative for diarrhea, nausea and vomiting.      Objective:     Physical Exam  Vitals reviewed.   Constitutional:       General: She is not in acute distress.     Appearance: Normal appearance. She is not ill-appearing.   HENT:      Head: Normocephalic.      Nose: Nose normal.   Cardiovascular:      Rate and Rhythm: Normal rate.   Pulmonary:      Effort: Pulmonary effort is normal. No respiratory distress.   Skin:     Capillary Refill: Capillary refill takes 2 to 3 seconds.   Neurological:      Mental Status: She is alert and oriented to person, place, and time.   Psychiatric:         Mood and Affect: Mood normal.         Behavior: Behavior normal.         Thought Content: Thought content normal.         Judgment: Judgment normal.       Neurologic:  Protective and light touch sensation intact bilateral lower extremity, + occasional paresthesias reported   Musculoskeletal:  Medial deviation of bilateral 1st metatarsal with prominent dorsal medial eminence, contracted right 2nd digit with overriding nature to right 1st digit laterally, ankle joint range of motion is mildly reduced without pain, no masses noted  bilateral foot   Dermatologic:  Incurvated right 1st digit lateral nail border with evidence of previous slant back performed, no open lesions noted bilateral foot, no rashes noted bilateral foot, mild thickening and discoloration of nails 1 through 5 bilateral noted   Vascular:  DP/ PT pulses palpable 1/4 bilateral foot, skin temperature gradient within normal limits from proximal to distal bilateral foot     Assessment:      Encounter Diagnoses   Name Primary?    Valgus deformity of both great toes Yes    Hammer toes of both feet     Asymptomatic PVD (peripheral vascular disease)     Onychomycosis due to dermatophyte      Plan:     Serena was seen today for nail care.    Diagnoses and all orders for this visit:    Valgus deformity of both great toes    Hammer toes of both feet    Asymptomatic PVD (peripheral vascular disease)    Onychomycosis due to dermatophyte      I counseled the patient on her conditions, their implications and medical management.        1. Patient was examined and evaluated  2. Patient made aware that previous irritated ingrown toenail site seems to have resolved.  Patient will monitor area for possible progression of symptoms.  Discussed again possible nail procedure if symptoms worsen over time.  Patient was advised of the etiology of fungal nail changes.  Patient will continue with topical OTC treatments such as Vicks vapor rub tea tree oil.    3. Discussed again with patient etiology of bunion deformity.  Patient was encouraged to continue use of shoe gear with increased toe box width and height and with soft stretchable uppers.  Patient was advised to adjunct with OTC analgesics for pain relief  4. Patient will ultimately follow up in 3 months or p.r.n. for complaints

## 2023-05-09 ENCOUNTER — PATIENT MESSAGE (OUTPATIENT)
Dept: RESEARCH | Facility: HOSPITAL | Age: 73
End: 2023-05-09
Payer: MEDICARE

## 2023-07-13 ENCOUNTER — OFFICE VISIT (OUTPATIENT)
Dept: PODIATRY | Facility: CLINIC | Age: 73
End: 2023-07-13
Payer: MEDICARE

## 2023-07-13 VITALS
WEIGHT: 194 LBS | DIASTOLIC BLOOD PRESSURE: 71 MMHG | SYSTOLIC BLOOD PRESSURE: 136 MMHG | HEART RATE: 73 BPM | HEIGHT: 68 IN | BODY MASS INDEX: 29.4 KG/M2

## 2023-07-13 DIAGNOSIS — M20.41 HAMMER TOES OF BOTH FEET: ICD-10-CM

## 2023-07-13 DIAGNOSIS — M20.12 VALGUS DEFORMITY OF BOTH GREAT TOES: Primary | ICD-10-CM

## 2023-07-13 DIAGNOSIS — M20.42 HAMMER TOES OF BOTH FEET: ICD-10-CM

## 2023-07-13 DIAGNOSIS — M20.11 VALGUS DEFORMITY OF BOTH GREAT TOES: Primary | ICD-10-CM

## 2023-07-13 PROCEDURE — 99212 OFFICE O/P EST SF 10 MIN: CPT | Mod: S$GLB,,, | Performed by: PODIATRIST

## 2023-07-13 PROCEDURE — 99212 PR OFFICE/OUTPT VISIT, EST, LEVL II, 10-19 MIN: ICD-10-PCS | Mod: S$GLB,,, | Performed by: PODIATRIST

## 2023-07-13 RX ORDER — VALACYCLOVIR HYDROCHLORIDE 500 MG/1
500 TABLET, FILM COATED ORAL
COMMUNITY
Start: 2023-06-24

## 2023-07-13 RX ORDER — PHENYLEPHRINE HCL 10 MG
500 TABLET ORAL
COMMUNITY
End: 2024-03-13

## 2023-07-13 RX ORDER — ZOSTER VACCINE RECOMBINANT, ADJUVANTED 50 MCG/0.5
KIT INTRAMUSCULAR
COMMUNITY
Start: 2023-05-18

## 2023-07-13 RX ORDER — FLUCONAZOLE 150 MG/1
150 TABLET ORAL
COMMUNITY
Start: 2023-04-22 | End: 2024-03-13

## 2023-07-13 RX ORDER — PHENAZOPYRIDINE HYDROCHLORIDE 200 MG/1
200 TABLET, FILM COATED ORAL
COMMUNITY
Start: 2023-06-05 | End: 2024-03-13

## 2023-07-13 RX ORDER — LISINOPRIL 5 MG/1
5 TABLET ORAL
COMMUNITY
Start: 2023-06-29

## 2023-07-13 RX ORDER — CIPROFLOXACIN 500 MG/1
TABLET ORAL
COMMUNITY
Start: 2023-04-22 | End: 2024-03-13

## 2023-07-13 RX ORDER — ASPIRIN 325 MG
50 TABLET, DELAYED RELEASE (ENTERIC COATED) ORAL
COMMUNITY

## 2023-07-13 RX ORDER — LANOLIN ALCOHOL/MO/W.PET/CERES
1000 CREAM (GRAM) TOPICAL
COMMUNITY

## 2023-09-21 ENCOUNTER — OFFICE VISIT (OUTPATIENT)
Dept: PODIATRY | Facility: CLINIC | Age: 73
End: 2023-09-21
Payer: MEDICARE

## 2023-09-21 VITALS
WEIGHT: 194 LBS | SYSTOLIC BLOOD PRESSURE: 134 MMHG | BODY MASS INDEX: 29.4 KG/M2 | HEART RATE: 72 BPM | DIASTOLIC BLOOD PRESSURE: 70 MMHG | HEIGHT: 68 IN

## 2023-09-21 DIAGNOSIS — M20.11 HALLUX VALGUS OF RIGHT FOOT: ICD-10-CM

## 2023-09-21 DIAGNOSIS — M20.41 HAMMER TOE OF RIGHT FOOT: Primary | ICD-10-CM

## 2023-09-21 DIAGNOSIS — M79.671 PAIN IN RIGHT FOOT: ICD-10-CM

## 2023-09-21 PROCEDURE — 99213 OFFICE O/P EST LOW 20 MIN: CPT | Mod: S$GLB,,, | Performed by: PODIATRIST

## 2023-09-21 PROCEDURE — 99213 PR OFFICE/OUTPT VISIT, EST, LEVL III, 20-29 MIN: ICD-10-PCS | Mod: S$GLB,,, | Performed by: PODIATRIST

## 2023-09-21 RX ORDER — LISINOPRIL 10 MG/1
10 TABLET ORAL
COMMUNITY
Start: 2023-06-20 | End: 2024-03-13

## 2023-09-21 RX ORDER — ABALOPARATIDE 2000 UG/ML
INJECTION, SOLUTION SUBCUTANEOUS
COMMUNITY
Start: 2023-09-11 | End: 2024-03-13

## 2023-10-05 NOTE — PROGRESS NOTES
Subjective:     Patient ID: Serena Irving is a 73 y.o. female.    Chief Complaint: Callouses and Hammer Toe    Serena is a 73 y.o. female who presents to the podiatry clinic  with complaint of  right foot pain. Onset of the symptoms was several years ago. Precipitating event: increased activity and progression of hammertoe deformity and recurrence of callus . Current symptoms include: ability to bear weight, but with some pain and worsening symptoms after a period of activity. Aggravating factors:  prolonged use and ill fitted shoes . Symptoms have waxed and waned but are better overall. Patient has had prior foot problems.  Treatment to date: rest and debridement of callus and shoe gear change . Patients rates pain 0/10 on pain scale.    Review of Systems   Constitutional: Negative for chills and fever.   Cardiovascular:  Positive for leg swelling. Negative for chest pain.   Respiratory:  Negative for cough and shortness of breath.    Gastrointestinal:  Negative for diarrhea, nausea and vomiting.        Objective:     Physical Exam  Vitals reviewed.   Constitutional:       General: She is not in acute distress.     Appearance: Normal appearance. She is not ill-appearing.   HENT:      Nose: Nose normal.   Cardiovascular:      Rate and Rhythm: Normal rate.   Pulmonary:      Effort: Pulmonary effort is normal. No respiratory distress.   Skin:     Capillary Refill: Capillary refill takes 2 to 3 seconds.   Neurological:      Mental Status: She is alert and oriented to person, place, and time.   Psychiatric:         Mood and Affect: Mood normal.         Behavior: Behavior normal.         Thought Content: Thought content normal.         Judgment: Judgment normal.     Neurologic:  Protective and light touch sensation intact bilateral lower extremity, + occasional paresthesias reported   Musculoskeletal:  Medial deviation of bilateral 1st metatarsal with prominent dorsal medial eminence, contracted right 2nd digit with  overriding nature to right 1st digit laterally, ankle joint range of motion is mildly reduced without pain, no masses noted bilateral foot   Dermatologic:  Incurvated right 1st digit lateral nail border with evidence of previous slant back performed, no open lesions noted bilateral foot, no rashes noted bilateral foot, mild thickening and discoloration of nails 1 through 5 bilateral noted, mild redness/callus formation dorsal aspect right 2nd digit PIPJ and lateral aspect right 1st digit IPJ   Vascular:  DP/ PT pulses palpable 1/4 bilateral foot, skin temperature gradient within normal limits from proximal to distal bilateral foot      Assessment:      Encounter Diagnoses   Name Primary?    Hammer toe of right foot Yes    Pain in right foot     Hallux valgus of right foot      Plan:     Serena was seen today for callouses and hammer toe.    Diagnoses and all orders for this visit:    Hammer toe of right foot    Pain in right foot    Hallux valgus of right foot      I counseled the patient on her conditions, their implications and medical management.        1. Patient was examined and evaluated.    2. Again discussed with patient etiology of hammertoe deformity.  Patient made aware of pain is secondary to overriding nature of right 2nd digit and pressure against right 1st digit.  Patient was advised to continue with efforts of shoe gear change including use of shoe gear with increased toe box height and width and soft stretchable uppers.  Patient will continue attempts at finding pads and cushions for protection of the right 2nd digit.  Discussed with patient again potential surgical intervention for bunion and hammertoe correction.  Patient was advised of possible benefits of local corticosteroid injection or Medrol Dosepak shoe pain complaints worsen over time.  Patient will adjunct with OTC analgesics and perform rest and ice application to right foot at the end of days.    3. Patient will follow-up p.r.n. for  complaints

## 2024-03-13 ENCOUNTER — OFFICE VISIT (OUTPATIENT)
Dept: PODIATRY | Facility: CLINIC | Age: 74
End: 2024-03-13
Payer: MEDICARE

## 2024-03-13 VITALS — WEIGHT: 196 LBS | HEIGHT: 68 IN | BODY MASS INDEX: 29.7 KG/M2

## 2024-03-13 DIAGNOSIS — M79.671 PAIN IN RIGHT FOOT: ICD-10-CM

## 2024-03-13 DIAGNOSIS — M20.11 HALLUX VALGUS OF RIGHT FOOT: Primary | ICD-10-CM

## 2024-03-13 DIAGNOSIS — M20.41 HAMMER TOE OF RIGHT FOOT: ICD-10-CM

## 2024-03-13 PROCEDURE — 99213 OFFICE O/P EST LOW 20 MIN: CPT | Mod: S$GLB,,, | Performed by: PODIATRIST

## 2024-03-13 RX ORDER — ASPIRIN 325 MG/1
100 TABLET, FILM COATED ORAL
COMMUNITY
Start: 2024-01-02

## 2024-03-13 RX ORDER — RESPIRATORY SYNCYTIAL VISUS VACCINE RECOMBINANT, ADJUVANTED 120MCG/0.5
KIT INTRAMUSCULAR
COMMUNITY
Start: 2023-10-26

## 2024-03-13 RX ORDER — ESTRADIOL 0.1 MG/G
CREAM VAGINAL
COMMUNITY
Start: 2024-03-11

## 2024-03-13 RX ORDER — CEPHALEXIN 250 MG/1
250 CAPSULE ORAL 4 TIMES DAILY
COMMUNITY
Start: 2024-03-11

## 2024-03-13 RX ORDER — FLUOROURACIL 50 MG/G
CREAM TOPICAL
COMMUNITY
Start: 2024-03-12

## 2024-03-18 ENCOUNTER — OFFICE VISIT (OUTPATIENT)
Dept: OBSTETRICS AND GYNECOLOGY | Facility: CLINIC | Age: 74
End: 2024-03-18
Payer: MEDICARE

## 2024-03-18 VITALS
DIASTOLIC BLOOD PRESSURE: 74 MMHG | HEIGHT: 68 IN | BODY MASS INDEX: 29.4 KG/M2 | WEIGHT: 194 LBS | SYSTOLIC BLOOD PRESSURE: 126 MMHG

## 2024-03-18 DIAGNOSIS — Z01.419 WOMEN'S ANNUAL ROUTINE GYNECOLOGICAL EXAMINATION: Primary | ICD-10-CM

## 2024-03-18 PROCEDURE — G0101 CA SCREEN;PELVIC/BREAST EXAM: HCPCS | Mod: S$GLB,,, | Performed by: OBSTETRICS & GYNECOLOGY

## 2024-03-18 NOTE — PROGRESS NOTES
Annual Well Woman's Exam  History & Physical      SUBJECTIVE:     History of Present Illness:  Patient is a 73 y.o. female presents for her annual exam. She has no complaints today. Her anxiety medication was changed from zoloft to prozac last month after having a panic attack while her  was in the hospital receiving a pace maker. Her heart cath was negative.     Chief Complaint   Patient presents with    Well Woman       Review of patient's allergies indicates:   Allergen Reactions    Sulfa (sulfonamide antibiotics)      Other reaction(s): Hives    Adhesive Rash    Codeine Nausea Only    Latex, natural rubber Dermatitis, Itching and Rash       Current Outpatient Medications   Medication Sig Dispense Refill    acetaminophen (TYLENOL) 650 MG TbSR Take by mouth.      AREXVY, PF, 120 mcg/0.5 mL SusR vaccine       ascorbic acid, vitamin C, (VITAMIN C) 500 MG tablet Place 500 mg under the tongue.      aspirin 81 mg Cap       biotin 5,000 mcg Subl Place under the tongue.      busPIRone (BUSPAR) 10 MG tablet Take 10 mg by mouth 2 (two) times daily as needed.      CALCIUM CARBONATE-VITAMIN D3 ORAL Take by mouth.      cephALEXin (KEFLEX) 250 MG capsule Take 250 mg by mouth 4 (four) times daily.      co-enzyme Q-10 50 mg capsule Take 50 mg by mouth.      cranberry 400 mg Cap Take by mouth.      cyanocobalamin (VITAMIN B-12) 1000 MCG tablet Take 1,000 mcg by mouth.      cyclobenzaprine (FLEXERIL) 10 MG tablet Take 1 tablet by mouth 2 (two) times daily as needed.      docusate sodium (COLACE) 100 MG capsule Take 100-200 mg by mouth every evening.      estradioL (ESTRACE) 0.01 % (0.1 mg/gram) vaginal cream Place vaginally.      fluorouraciL (EFUDEX) 5 % cream Apply topically.      fluticasone propionate (FLONASE) 50 mcg/actuation nasal spray 2 sprays by Each Nostril route 2 (two) times daily.      furosemide (LASIX) 20 MG tablet Take 20 mg by mouth.      GAVILYTE-G 236-22.74-6.74 -5.86 gram suspension Take by mouth.       baqznauc-xxzz-ayu3-C-shruthi-bosw 750-625-30 mg Tab Take 2 tablets by mouth once daily.      ipratropium (ATROVENT) 21 mcg (0.03 %) nasal spray 2 sprays 3 (three) times daily.      lisinopriL (PRINIVIL,ZESTRIL) 5 MG tablet Take 5 mg by mouth.      loratadine (CLARITIN) 10 mg tablet Take 10 mg by mouth.      magnesium oxide 500 mg Tab Place under the tongue.      MULTIVITAMIN ORAL Take by mouth.      omeprazole (PRILOSEC) 40 MG capsule Take 40 mg by mouth 2 (two) times daily.      oxybutynin (DITROPAN) 5 MG Tab       rosuvastatin (CRESTOR) 5 MG tablet Take 5 mg by mouth.      sertraline (ZOLOFT) 100 MG tablet Take by mouth.      SHINGRIX, PF, 50 mcg/0.5 mL injection       thiamine 100 MG tablet Take 100 mg by mouth once daily.      thiamine mononitrate, vit B1, (VITAMIN B-1, MONONITRATE,) 100 mg Tab Take 100 mg by mouth.      valACYclovir (VALTREX) 500 MG tablet Take 500 mg by mouth.      folic acid (FOLVITE) 1 MG tablet Take 1 mg by mouth.       No current facility-administered medications for this visit.     OB History          3    Para   3    Term   3            AB        Living   3         SAB        IAB        Ectopic        Multiple        Live Births                 No LMP recorded. Patient has had a hysterectomy.      Past Medical History:   Diagnosis Date    Anxiety     Still taking antidepressants    Depression     On medication    Endometriosis of uterus     Fibroid in  my breast    Early 20s    Genital warts     Have not had any further teatment    Hormone disorder     Took hormons for about 20 years    Hyperlipidemia     Hypertension     Osteoporosis Oct 2022    Had vertabra fuson of T 4, 5, 6, 7, 8 on 22    Urinary incontinence When I have a urinary infection, I can not hold my urine    I have had urinary infections since      Past Surgical History:   Procedure Laterality Date    APPENDECTOMY      BLADDER SUSPENSION  87    COLPOSCOPY  22    Dr. Ceron  "wants to repeat in a year.    disk fusion      HYSTERECTOMY      TOTAL KNEE ARTHROPLASTY Right     TOTAL SHOULDER ARTHROPLASTY Right      Family History   Problem Relation Age of Onset    Cancer Mother          - Brain    Diabetes Mother         border line    Osteoarthritis Mother             Seizures Mother          - Grand Mal Seizure    Cancer Father          - Dougie cancer    Rashes / Skin problems Father             Cancer Sister     Cancer Maternal Cousin     Breast cancer Maternal Aunt             Cancer Maternal Aunt          - uterine    Endometrial cancer Maternal Aunt     Cancer Maternal Uncle          - Bladder    Hypertension Brother      Social History     Tobacco Use    Smoking status: Never    Smokeless tobacco: Never   Substance Use Topics    Alcohol use: Yes     Alcohol/week: 2.0 standard drinks of alcohol     Types: 2 Glasses of wine per week    Drug use: Never        OBJECTIVE:     Vital Signs (Most Recent)  BP: 126/74 (24 1030)  5' 8" (1.727 m)  88 kg (194 lb)     Physical Exam:  Physical Exam  Vitals reviewed.   Constitutional:       Appearance: Normal appearance.   HENT:      Head: Normocephalic.   Neck:      Thyroid: No thyroid mass, thyromegaly or thyroid tenderness.   Pulmonary:      Effort: Pulmonary effort is normal.   Chest:   Breasts:     Right: Normal.      Left: Normal.   Abdominal:      General: Abdomen is flat.      Palpations: Abdomen is soft.   Genitourinary:     General: Normal vulva.      Vagina: Normal.      Uterus: Absent.       Adnexa: Right adnexa normal and left adnexa normal.   Lymphadenopathy:      Upper Body:      Right upper body: No axillary adenopathy.      Left upper body: No axillary adenopathy.   Skin:     General: Skin is warm and dry.   Neurological:      General: No focal deficit present.      Mental Status: She is alert and oriented to person, place, and time.   Psychiatric:         Mood and " Affect: Mood normal.         Behavior: Behavior normal.         ASSESSMENT/PLAN:       ICD-10-CM ICD-9-CM   1. Women's annual routine gynecological examination  Z01.419 V72.31       PLAN:    --Paps no longer indicated. Pt is s/p hysterectomy and has been in a monogamous relationship for 47 years.   --Mammogram ordered.  --Colonoscopy performed in February 2023. Both parents passed from colon cancer.   --Dexa scan up to date.   --Follow up in 1 year for annual exam or sooner as needed    Portia Hagan MD   Gynecology    [address]    [address]  [place] [address] [phone]

## 2024-03-27 NOTE — PROGRESS NOTES
Subjective:     Patient ID: Serena Irving is a 73 y.o. female.    Chief Complaint: Hammer Toe    Serena is a 73 y.o. female who presents to the podiatry clinic  with complaint of  right foot pain. Onset of the symptoms was several months ago. Precipitating event:  progressive bunion and hammertoe deformity . Current symptoms include: worsening symptoms after a period of activity. Aggravating factors:  ill fitted shoes . Symptoms have waxed and waned but are better overall. Patient has had prior foot problems.  Treatment to date:  adjustment of shoe gear for better comfort and fit . Patients rates pain 0/10 on pain scale.    Review of Systems   Constitutional: Negative for chills and fever.   Cardiovascular:  Negative for chest pain and leg swelling.   Respiratory:  Negative for cough and shortness of breath.    Gastrointestinal:  Negative for diarrhea, nausea and vomiting.        Objective:     Physical Exam  Vitals reviewed.   Constitutional:       General: She is not in acute distress.     Appearance: Normal appearance. She is not ill-appearing.   HENT:      Nose: Nose normal.   Cardiovascular:      Rate and Rhythm: Normal rate.   Pulmonary:      Effort: Pulmonary effort is normal. No respiratory distress.   Skin:     Capillary Refill: Capillary refill takes 2 to 3 seconds.   Neurological:      Mental Status: She is alert and oriented to person, place, and time.   Psychiatric:         Mood and Affect: Mood normal.         Behavior: Behavior normal.         Thought Content: Thought content normal.         Judgment: Judgment normal.     Neurologic:  Protective and light touch sensation intact bilateral lower extremity, + occasional paresthesias reported   Musculoskeletal:  Medial deviation of bilateral 1st metatarsal with prominent dorsal medial eminence, contracted right 2nd digit with overriding nature to right 1st digit laterally, ankle joint range of motion is mildly reduced without pain, no masses noted  bilateral foot   Dermatologic:  Incurvated right 1st digit lateral nail border with evidence of previous slant back performed, no open lesions noted bilateral foot, no rashes noted bilateral foot, mild thickening and discoloration of nails 1 through 5 bilateral noted   Vascular:  DP/ PT pulses palpable 1/4 bilateral foot, skin temperature gradient within normal limits from proximal to distal bilateral foot       Assessment:      Encounter Diagnoses   Name Primary?    Pain in right foot     Hammer toe of right foot     Hallux valgus of right foot Yes     Plan:     Serena was seen today for hammer toe.    Diagnoses and all orders for this visit:    Hallux valgus of right foot    Pain in right foot    Hammer toe of right foot      I counseled the patient on her conditions, their implications and medical management.        1. Patient was examined and evaluated.    2. Patient was advised of the etiology of hammertoe deformity.  Patient was advised to adjust shoe gear for better comfort and fit including increased toe box height and width and selection shoe gear with soft stretchable uppers.  3. Discussed with patient etiology of bunion deformity/hallux abductovalgus.  Discussed conservative treatment options with the patient.  Patient was advised to adjust shoe gear for better comfort and fit including increased toe box height and width and selection shoe gear with soft stretchable uppers.  Discussed with patient potential benefits of a local corticosteroid injection or oral Medrol Dosepak for improvement of pain and inflammation.  Patient was advised that she can adjunct with NSAIDs for pain relief as well.  Briefly discussed with patient surgical intervention including Aston bunionectomy.    4. Patient was advised to follow-up p.r.n. for complaints

## 2024-04-23 ENCOUNTER — PATIENT OUTREACH (OUTPATIENT)
Dept: ADMINISTRATIVE | Facility: HOSPITAL | Age: 74
End: 2024-04-23
Payer: COMMERCIAL

## 2024-04-23 NOTE — PROGRESS NOTES
Population Health Chart Review & Patient Outreach Details      Additional [facility] Notes:    CMS/MSSP Non-compliant report chart audits for COLON CANCER SCREENING. Chart review completed for HM test overdue (mammograms, Colonoscopies, pap smears, DM labs, and/or EYE EXAMs)      Care Everywhere and media, updates requested and reviewed.                 Updates Requested / Reviewed:      Care Everywhere, , External Sources: [place], and Care Team Updated         Health Maintenance Topics Overdue:      [facility] Score: 1     Mammogram    Shingles/Zoster Vaccine  RSV Vaccine                  Health Maintenance Topic(s) Outreach Outcomes & Actions Taken:    Colorectal Cancer Screening - Outreach Outcomes & Actions Taken  : External Records Uploaded, Care Team Updated, & History Updated if Applicable

## 2024-09-13 ENCOUNTER — OFFICE VISIT (OUTPATIENT)
Dept: PODIATRY | Facility: CLINIC | Age: 74
End: 2024-09-13
Payer: MEDICARE

## 2024-09-13 VITALS — HEIGHT: 68 IN | BODY MASS INDEX: 29.04 KG/M2 | WEIGHT: 191.63 LBS

## 2024-09-13 DIAGNOSIS — I73.9 ASYMPTOMATIC PVD (PERIPHERAL VASCULAR DISEASE): Primary | ICD-10-CM

## 2024-09-13 DIAGNOSIS — M20.12 VALGUS DEFORMITY OF BOTH GREAT TOES: ICD-10-CM

## 2024-09-13 DIAGNOSIS — L60.9 DISEASE OF NAIL: ICD-10-CM

## 2024-09-13 DIAGNOSIS — M20.42 HAMMER TOES OF BOTH FEET: ICD-10-CM

## 2024-09-13 DIAGNOSIS — M20.41 HAMMER TOES OF BOTH FEET: ICD-10-CM

## 2024-09-13 DIAGNOSIS — M20.11 VALGUS DEFORMITY OF BOTH GREAT TOES: ICD-10-CM

## 2024-09-13 RX ORDER — FLUOXETINE HYDROCHLORIDE 20 MG/1
60 CAPSULE ORAL
COMMUNITY
Start: 2024-07-25

## 2024-09-13 RX ORDER — LACTULOSE 10 G/15ML
15 SOLUTION ORAL
COMMUNITY
Start: 2024-06-17

## 2024-09-13 RX ORDER — ONDANSETRON HYDROCHLORIDE 8 MG/1
8 TABLET, FILM COATED ORAL EVERY 8 HOURS PRN
COMMUNITY
Start: 2024-07-09

## 2024-09-13 RX ORDER — NIRMATRELVIR AND RITONAVIR 150-100 MG
KIT ORAL 2 TIMES DAILY
COMMUNITY
Start: 2024-07-09

## 2024-09-13 RX ORDER — TRAMADOL HYDROCHLORIDE 50 MG/1
TABLET ORAL
COMMUNITY
Start: 2024-06-19

## 2024-09-13 RX ORDER — PROMETHAZINE HYDROCHLORIDE 25 MG/1
25 TABLET ORAL EVERY 6 HOURS PRN
COMMUNITY
Start: 2024-07-09

## 2024-09-13 NOTE — PROGRESS NOTES
Subjective:     Patient ID: Serena Irving is a 74 y.o. female    Chief Complaint: Hammer Toe       Serena is a 74 y.o. female who presents to the clinic for evaluation and treatment of high risk feet. Serena has a past medical history of Anxiety (1999), Depression (1978), Endometriosis of uterus (1980), Fibroid (in  my breast), Genital warts (1978), Hormone disorder (1993), Hyperlipidemia, Hypertension, Osteoporosis (Oct 2022), Personal history of colonic polyps, and Urinary incontinence (When I have a urinary infection, I can not hold my urine). The patient's chief complaint is long, thick toenails. This patient has documented high risk feet requiring routine maintenance secondary to peripheral vascular disease.    PCP: Trevor Mora MD    Date Last Seen by PCP: 06/17/2024    Current shoe gear:  Affected Foot: Tennis shoes     Unaffected Foot: Tennis shoes    Last encounter in this department: Visit date not found    Hemoglobin A1C   Date Value Ref Range Status   06/29/2023 5.3 (L) 5.7 - 6.4 % Final   09/22/2022 5.6 (L) 5.7 - 6.4 % Final       Review of Systems   Constitutional: Negative.  Negative for chills and fever.   Respiratory:  Negative for cough and shortness of breath.    Cardiovascular:  Positive for leg swelling. Negative for chest pain.   Gastrointestinal:  Negative for diarrhea, nausea and vomiting.   Neurological:  Positive for tingling.        Objective:   Physical Exam  Vitals reviewed.   Constitutional:       General: She is not in acute distress.     Appearance: Normal appearance. She is not ill-appearing.   HENT:      Head: Normocephalic.      Nose: Nose normal.   Cardiovascular:      Pulses:           Dorsalis pedis pulses are 1+ on the right side and 1+ on the left side.        Posterior tibial pulses are 1+ on the right side and 1+ on the left side.   Pulmonary:      Effort: Pulmonary effort is normal. No respiratory distress.   Skin:     Capillary Refill: Capillary refill takes 2 to 3  seconds.   Neurological:      Mental Status: She is alert and oriented to person, place, and time.   Psychiatric:         Mood and Affect: Mood normal.         Behavior: Behavior normal.         Thought Content: Thought content normal.         Judgment: Judgment normal.        Foot Exam    General  General Appearance: appears stated age and healthy   Orientation: alert and oriented to person, place, and time   Affect: appropriate   Gait: antalgic       Right Foot/Ankle     Inspection and Palpation  Ecchymosis: none  Tenderness: none   Swelling: dorsum   Hammertoes: second toe  Skin Exam: skin changes and abnormal color;     Neurovascular  Dorsalis pedis: 1+  Posterior tibial: 1+      Left Foot/Ankle      Inspection and Palpation  Ecchymosis: none  Tenderness: none   Swelling: dorsum   Hammertoes: second toe  Skin Exam: skin changes and abnormal color;     Neurovascular  Dorsalis pedis: 1+  Posterior tibial: 1+      Vascular: +1 nonpitting edema noted bilateral ankle, right greater than left, pedal hair growth is absent bilateral lower extremity, positive telangiectasias and varicosities noted bilateral foot, skin temperature gradient warm to cool from proximal to distal bilateral lower extremity, pedal hair growth is absent bilateral lower extremity   Dermatologic: Nails 1 through 5 bilateral mildly thickened mildly discolored mildly elongated, no open lesions noted bilateral foot   Neurologic: Positive paresthesias reported right foot, positive occasional burning right foot     Assessment:         1. Asymptomatic PVD (peripheral vascular disease)    2. Valgus deformity of both great toes    3. Hammer toes of both feet    4. Disease of nail       Plan:     Serena Irving was seen today for   Chief Complaint   Patient presents with    Hammer Toe       Assessment & Plan           Routine Foot Care    Date/Time: 9/13/2024 10:15 AM    Performed by: Jigar Vigil DPM  Authorized by: Jigar Vigil DPM     Consent Done?:  Yes (Verbal)    Nail Care Type:  Debride  Location(s): All  (Left 1st Toe, Left 3rd Toe, Left 2nd Toe, Left 4th Toe, Left 5th Toe, Right 1st Toe, Right 2nd Toe, Right 3rd Toe, Right 4th Toe and Right 5th Toe)  Patient tolerance:  Patient tolerated the procedure well with no immediate complications       1. Patient was examined and evaluated.    2. Patient made aware of new onset nerve related complaints including tingling and numbness maybe related to continued use of walking boot for prior lower leg injury to the right foot.  Patient made aware that symptoms may natural resolve over time as she is now not using the boot.  Patient was advised that we will attempt treatment should symptoms persist over time or worsen.    3. Patient was advised of the etiology of hammertoe deformity.  Patient was advised to adjust shoe gear for better comfort and fit including increased toe box height and width and selection shoe gear with soft stretchable uppers.  4. Discussed with patient the etiology of nail fungus and as possible secondary issue to prior nail trauma.  Discussed with patient OTC topical conservative treatments such as Vicks vapor rub, tea tree oil as well as coconut oil.  Discussed with patient risks and benefits oral Lamisil therapy.   5. Patient will follow up in 6 months or p.r.n. for complaints      Mario Vigil DPM  [id number] [id number] [phone]      This note was created using [employer] direct voice recognition software. Note may have occasional typographical errors that may not have been identified and edited despite initial review prior to signing.

## 2025-03-19 ENCOUNTER — OFFICE VISIT (OUTPATIENT)
Dept: OBSTETRICS AND GYNECOLOGY | Facility: CLINIC | Age: 75
End: 2025-03-19
Payer: COMMERCIAL

## 2025-03-19 VITALS
DIASTOLIC BLOOD PRESSURE: 86 MMHG | BODY MASS INDEX: 28.79 KG/M2 | WEIGHT: 190 LBS | SYSTOLIC BLOOD PRESSURE: 138 MMHG | HEIGHT: 68 IN

## 2025-03-19 DIAGNOSIS — Z01.419 WOMEN'S ANNUAL ROUTINE GYNECOLOGICAL EXAMINATION: Primary | ICD-10-CM

## 2025-03-19 PROCEDURE — 99397 PER PM REEVAL EST PAT 65+ YR: CPT | Mod: S$GLB,,, | Performed by: OBSTETRICS & GYNECOLOGY

## 2025-03-19 PROCEDURE — 99999 PR PBB SHADOW E&M-EST. PATIENT-LVL V: CPT | Mod: PBBFAC,,, | Performed by: OBSTETRICS & GYNECOLOGY

## 2025-03-19 NOTE — PROGRESS NOTES
Annual Well Woman's Exam  History & Physical      SUBJECTIVE:     History of Present Illness:  Patient is a 74 y.o. female presents for her annual exam. She has no complaints today.  She is status post hysterectomy, but still has 1 ovary remaining.    Chief Complaint   Patient presents with    Well Woman       Review of patient's allergies indicates:   Allergen Reactions    Mold Hives     Other Reaction(s): Headache    Sulfa (sulfonamide antibiotics)      Other reaction(s): Hives    Adhesive Rash    Codeine Nausea Only    Latex, natural rubber Dermatitis, Itching and Rash       Current Medications[1]  OB History          3    Para   3    Term   3            AB        Living   3         SAB        IAB        Ectopic        Multiple        Live Births                 No LMP recorded. Patient has had a hysterectomy.      Past Medical History:   Diagnosis Date    Anxiety     Still taking antidepressants    Depression     On medication    Endometriosis of uterus     Fibroid in  my breast    Early 20s    Genital warts     Have not had any further teatment    Hormone disorder     Took hormons for about 20 years    Hyperlipidemia     Hypertension     Osteoporosis Oct 2022    Had vertabra fuson of T 4, 5, 6, 7, 8 on 22    Personal history of colonic polyps     Urinary incontinence When I have a urinary infection, I can not hold my urine    I have had urinary infections since      Past Surgical History:   Procedure Laterality Date    APPENDECTOMY      BLADDER SUSPENSION  87    COLONOSCOPY  2023    Haseeb Ceron MD    COLPOSCOPY  22    Dr. Ceron wants to repeat in a year.    disk fusion      HYSTERECTOMY      TOTAL KNEE ARTHROPLASTY Right     TOTAL SHOULDER ARTHROPLASTY Right      Family History   Problem Relation Name Age of Onset    Cancer Mother Kody Steen          - Brain    Diabetes Mother Kody Steen         border line    Osteoarthritis  "Mother Kody Steen             Seizures Mother Kody Steen          - Grand Mal Seizure    Cancer Father Ye Steen          - Dougie cancer    Rashes / Skin problems Father Ye Steen             Cancer Sister Nathalie Durán     Cancer Maternal Cousin      Breast cancer Maternal Aunt Anaya Feliz             Cancer Maternal Aunt Nathalie Durán          - uterine    Endometrial cancer Maternal Aunt Nathalie Durán     Cancer Maternal Uncle Yvan Barnes          - Bladder    Hypertension Brother Wm. Steen      Social History[2]     OBJECTIVE:     Vital Signs (Most Recent)  BP: 138/86 (25 0952)  5' 8" (1.727 m)  86.2 kg (190 lb)     Physical Exam:  Physical Exam  Vitals reviewed.   Constitutional:       Appearance: Normal appearance.   HENT:      Head: Normocephalic.   Neck:      Thyroid: No thyroid mass, thyromegaly or thyroid tenderness.   Pulmonary:      Effort: Pulmonary effort is normal.   Chest:   Breasts:     Right: Normal.      Left: Normal.   Abdominal:      General: Abdomen is flat.      Palpations: Abdomen is soft.   Genitourinary:     General: Normal vulva.      Vagina: Normal.      Uterus: Absent.       Adnexa: Right adnexa normal and left adnexa normal.   Lymphadenopathy:      Upper Body:      Right upper body: No axillary adenopathy.      Left upper body: No axillary adenopathy.   Skin:     General: Skin is warm and dry.   Neurological:      General: No focal deficit present.      Mental Status: She is alert and oriented to person, place, and time.   Psychiatric:         Mood and Affect: Mood normal.         Behavior: Behavior normal.         ASSESSMENT/PLAN:       ICD-10-CM ICD-9-CM   1. Women's annual routine gynecological examination  Z01.419 V72.31     PLAN:    --Pap smears are no longer indicated  --Mammogram ordered.  --Colon cancer screening is up-to-date  --Patient believes that she had her DEXA scan " performed in January.  I can not find this in her records.  If it was not, that she is due for repeat screening.  She reports that she will follow up with her PCM to see if she needs to have this study done.  --Follow up in 1 year for annual exam or sooner as needed    Portia Hagan MD, FACOG   Gynecology    [address]  [place] [address]    [phone]                  [1]   Current Outpatient Medications   Medication Sig Dispense Refill    acetaminophen (TYLENOL) 650 MG TbSR Take by mouth.      AREXVY, PF, 120 mcg/0.5 mL SusR vaccine       ascorbic acid, vitamin C, (VITAMIN C) 500 MG tablet Place 500 mg under the tongue.      aspirin 81 mg Cap       biotin 5,000 mcg Subl Place under the tongue.      busPIRone (BUSPAR) 10 MG tablet Take 10 mg by mouth 2 (two) times daily as needed.      CALCIUM CARBONATE-VITAMIN D3 ORAL Take by mouth.      co-enzyme Q-10 50 mg capsule Take 50 mg by mouth.      cranberry 400 mg Cap Take by mouth.      cyanocobalamin (VITAMIN B-12) 1000 MCG tablet Take 1,000 mcg by mouth.      docusate sodium (COLACE) 100 MG capsule Take 100-200 mg by mouth every evening.      estradioL (ESTRACE) 0.01 % (0.1 mg/gram) vaginal cream Place vaginally.      FLUoxetine 20 MG capsule 60 mg.      fluticasone propionate (FLONASE) 50 mcg/actuation nasal spray 2 sprays by Each Nostril route 2 (two) times daily.      furosemide (LASIX) 20 MG tablet Take 20 mg by mouth.      GAVILYTE-G 236-22.74-6.74 -5.86 gram suspension Take by mouth.      isdgkvbf-[id number]-gnn3-C-shruthi-bosw 750-625-30 mg Tab Take 2 tablets by mouth once daily.      ipratropium (ATROVENT) 21 mcg (0.03 %) nasal spray 2 sprays 3 (three) times daily.      lisinopriL (PRINIVIL,ZESTRIL) 5 MG tablet Take 5 mg by mouth.      loratadine (CLARITIN) 10 mg tablet Take 10 mg by mouth.      magnesium oxide 500 mg Tab Place under the tongue.      MULTIVITAMIN ORAL Take by mouth.      omeprazole (PRILOSEC) 40 MG capsule Take 40 mg by mouth  2 (two) times daily.      oxybutynin (DITROPAN) 5 MG Tab       rosuvastatin (CRESTOR) 5 MG tablet Take 5 mg by mouth.      SHINGRIX, PF, 50 mcg/0.5 mL injection       valACYclovir (VALTREX) 500 MG tablet Take 500 mg by mouth.      fluorouraciL (EFUDEX) 5 % cream Apply topically.      folic acid (FOLVITE) 1 MG tablet Take 1 mg by mouth.      lactulose 10 gram/15 ml (CHRONULAC) 10 gram/15 mL (15 mL) solution Take 15 mLs by mouth.      ondansetron (ZOFRAN) 8 MG tablet Take 8 mg by mouth every 8 (eight) hours as needed.      PAXLOVID 150-100 mg DsPk Take by mouth 2 (two) times daily.      promethazine (PHENERGAN) 25 MG tablet Take 25 mg by mouth every 6 (six) hours as needed.      thiamine 100 MG tablet Take 100 mg by mouth once daily.      thiamine mononitrate, vit B1, (VITAMIN B-1, MONONITRATE,) 100 mg Tab Take 100 mg by mouth.      traMADoL (ULTRAM) 50 mg tablet Take by mouth.       No current facility-administered medications for this visit.   [2]   Social History  Tobacco Use    Smoking status: Never    Smokeless tobacco: Never   Substance Use Topics    Alcohol use: Yes     Alcohol/week: 2.0 standard drinks of alcohol     Types: 2 Glasses of wine per week    Drug use: Never